# Patient Record
Sex: FEMALE | Race: BLACK OR AFRICAN AMERICAN | NOT HISPANIC OR LATINO | Employment: FULL TIME | URBAN - METROPOLITAN AREA
[De-identification: names, ages, dates, MRNs, and addresses within clinical notes are randomized per-mention and may not be internally consistent; named-entity substitution may affect disease eponyms.]

---

## 2023-04-06 LAB
HEPATITIS B VIRUS SURFACE AG PRESENCE IN SERUM: NONREACTIVE
HEPATITIS C VIRUS AB PRESENCE IN SERUM: NONREACTIVE
HIV 1/ 2 AG/AB SCREEN: NONREACTIVE
SYPHILIS TOTAL AB: NONREACTIVE

## 2023-04-07 LAB
CHLAMYDIA TRACH., AMPLIFIED: NEGATIVE
CLUE CELLS: NORMAL
N. GONORRHEA, AMPLIFIED: NEGATIVE
NUGENT SCORE: 1
YEAST: NORMAL

## 2023-04-08 LAB — TRICHOMONAS VAGINALIS: NEGATIVE

## 2023-05-12 LAB
CHLAMYDIA TRACH., AMPLIFIED: NEGATIVE
N. GONORRHEA, AMPLIFIED: NEGATIVE
TRICHOMONAS VAGINALIS: NEGATIVE

## 2023-07-07 ENCOUNTER — APPOINTMENT (OUTPATIENT)
Dept: PRIMARY CARE | Facility: CLINIC | Age: 23
End: 2023-07-07
Payer: COMMERCIAL

## 2023-07-12 RX ORDER — ATOVAQUONE AND PROGUANIL HYDROCHLORIDE 250; 100 MG/1; MG/1
TABLET, FILM COATED ORAL
COMMUNITY
End: 2023-07-13 | Stop reason: ALTCHOICE

## 2023-07-12 RX ORDER — NEOMYCIN SULFATE, POLYMYXIN B SULFATE AND HYDROCORTISONE 10; 3.5; 1 MG/ML; MG/ML; [USP'U]/ML
SUSPENSION/ DROPS AURICULAR (OTIC)
COMMUNITY
End: 2023-07-13 | Stop reason: ALTCHOICE

## 2023-07-12 RX ORDER — LEVONORGESTREL 52 MG/1
INTRAUTERINE DEVICE INTRAUTERINE
COMMUNITY
Start: 2020-07-01

## 2023-07-12 RX ORDER — DOXYCYCLINE HYCLATE 100 MG
TABLET ORAL
COMMUNITY
End: 2023-07-13 | Stop reason: ALTCHOICE

## 2023-07-12 RX ORDER — CYCLOBENZAPRINE HCL 10 MG
TABLET ORAL EVERY 8 HOURS
COMMUNITY
Start: 2021-05-25 | End: 2023-07-13 | Stop reason: ALTCHOICE

## 2023-07-12 RX ORDER — MELATONIN 10 MG/ML
1 DROPS ORAL DAILY
COMMUNITY
Start: 2019-12-03 | End: 2020-01-02

## 2023-07-12 ASSESSMENT — ENCOUNTER SYMPTOMS
PALPITATIONS: 0
EYE PAIN: 0
FREQUENCY: 0
DIZZINESS: 0
MYALGIAS: 0
FACIAL SWELLING: 0
ABDOMINAL PAIN: 0
CHILLS: 0
WHEEZING: 0
POLYPHAGIA: 0
JOINT SWELLING: 0
HEADACHES: 0
ARTHRALGIAS: 0
COUGH: 0
CHOKING: 0
WEAKNESS: 0
CHEST TIGHTNESS: 0
DIFFICULTY URINATING: 0
POLYDIPSIA: 0
EYE DISCHARGE: 0
FEVER: 0
CONFUSION: 0
DIARRHEA: 0
NERVOUS/ANXIOUS: 0
NAUSEA: 0
COLOR CHANGE: 0
ANAL BLEEDING: 0
SLEEP DISTURBANCE: 0
FATIGUE: 0
SORE THROAT: 0
SHORTNESS OF BREATH: 0
NUMBNESS: 0
CONSTIPATION: 0
TREMORS: 0
ABDOMINAL DISTENTION: 0
VOMITING: 0
HEMATURIA: 0
APPETITE CHANGE: 0

## 2023-07-13 ENCOUNTER — OFFICE VISIT (OUTPATIENT)
Dept: PRIMARY CARE | Facility: CLINIC | Age: 23
End: 2023-07-13
Payer: COMMERCIAL

## 2023-07-13 VITALS
DIASTOLIC BLOOD PRESSURE: 64 MMHG | BODY MASS INDEX: 27.68 KG/M2 | HEIGHT: 60 IN | TEMPERATURE: 97.7 F | WEIGHT: 141 LBS | SYSTOLIC BLOOD PRESSURE: 100 MMHG

## 2023-07-13 DIAGNOSIS — Z00.00 ROUTINE ADULT HEALTH MAINTENANCE: Primary | ICD-10-CM

## 2023-07-13 PROCEDURE — 80061 LIPID PANEL: CPT | Performed by: PHYSICIAN ASSISTANT

## 2023-07-13 PROCEDURE — 80053 COMPREHEN METABOLIC PANEL: CPT | Performed by: PHYSICIAN ASSISTANT

## 2023-07-13 PROCEDURE — 99385 PREV VISIT NEW AGE 18-39: CPT | Performed by: PHYSICIAN ASSISTANT

## 2023-07-13 PROCEDURE — 85025 COMPLETE CBC W/AUTO DIFF WBC: CPT | Performed by: PHYSICIAN ASSISTANT

## 2023-07-13 PROCEDURE — 1036F TOBACCO NON-USER: CPT | Performed by: PHYSICIAN ASSISTANT

## 2023-07-13 RX ORDER — IBUPROFEN 800 MG/1
TABLET ORAL
COMMUNITY
Start: 2023-01-13 | End: 2023-09-22 | Stop reason: ALTCHOICE

## 2023-07-13 ASSESSMENT — ENCOUNTER SYMPTOMS
LOSS OF SENSATION IN FEET: 0
DEPRESSION: 1
OCCASIONAL FEELINGS OF UNSTEADINESS: 0

## 2023-07-13 ASSESSMENT — PATIENT HEALTH QUESTIONNAIRE - PHQ9
SUM OF ALL RESPONSES TO PHQ9 QUESTIONS 1 AND 2: 2
10. IF YOU CHECKED OFF ANY PROBLEMS, HOW DIFFICULT HAVE THESE PROBLEMS MADE IT FOR YOU TO DO YOUR WORK, TAKE CARE OF THINGS AT HOME, OR GET ALONG WITH OTHER PEOPLE: SOMEWHAT DIFFICULT
1. LITTLE INTEREST OR PLEASURE IN DOING THINGS: SEVERAL DAYS
2. FEELING DOWN, DEPRESSED OR HOPELESS: SEVERAL DAYS

## 2023-07-13 ASSESSMENT — COLUMBIA-SUICIDE SEVERITY RATING SCALE - C-SSRS
1. IN THE PAST MONTH, HAVE YOU WISHED YOU WERE DEAD OR WISHED YOU COULD GO TO SLEEP AND NOT WAKE UP?: NO
6. HAVE YOU EVER DONE ANYTHING, STARTED TO DO ANYTHING, OR PREPARED TO DO ANYTHING TO END YOUR LIFE?: NO
2. HAVE YOU ACTUALLY HAD ANY THOUGHTS OF KILLING YOURSELF?: NO

## 2023-07-13 NOTE — PROGRESS NOTES
Subjective   Patient ID: Heaven Engel is a 23 y.o. female with no significant medical history who presents for Establish Care.    HPI the patient is presented to become established as a new patient with a primary care provider. The patient reports bumps on her pubic area after she waxed it a month ago.  Stated she needed to exfoliate the skin but did not do it until recently which is now improving her bumps.  Otherwise, the patient feels healthy.  She does not take any medications including vitamins or supplements.  She occasionally has headaches after hangover for which she takes ibuprofen.  She drinks alcohol socially and uses marijuana every 2 days now.  Stated she used to be a daily smoker but she is trying to get her life back on track.  She started exercising more frequently by lifting and stretching.  She works 2 jobs at [employer] and [employer].  Today she denies shortness of breath or chest pain.  There is no nausea, vomiting, constipation, or diarrhea.  No abdominal pain.  Denies dizziness, presyncope or syncope.    Review of Systems   Constitutional:  Negative for appetite change, chills, fatigue and fever.   HENT:  Negative for congestion, ear pain, facial swelling, hearing loss, nosebleeds and sore throat.    Eyes:  Negative for pain, discharge and visual disturbance.   Respiratory:  Negative for cough, choking, chest tightness, shortness of breath and wheezing.    Cardiovascular:  Negative for chest pain, palpitations and leg swelling.   Gastrointestinal:  Negative for abdominal distention, abdominal pain, anal bleeding, constipation, diarrhea, nausea and vomiting.   Endocrine: Negative for cold intolerance, heat intolerance, polydipsia, polyphagia and polyuria.   Genitourinary:  Negative for difficulty urinating, frequency, hematuria and urgency.   Musculoskeletal:  Negative for arthralgias, gait problem, joint swelling and myalgias.   Skin:  Negative for color change and rash.        Bumps in pubic  "area   Neurological:  Negative for dizziness, tremors, syncope, weakness, numbness and headaches.   Psychiatric/Behavioral:  Negative for behavioral problems, confusion, sleep disturbance and suicidal ideas. The patient is not nervous/anxious.        Objective   /64   Temp 36.5 °C (97.7 °F)   Ht 1.511 m (4' 11.5\")   Wt 64 kg (141 lb)   LMP 07/09/2023   BMI 28.00 kg/m²     Physical Exam  Constitutional:       General: She is not in acute distress.     Appearance: Normal appearance.   HENT:      Head: Normocephalic and atraumatic.      Nose: Nose normal.   Eyes:      Extraocular Movements: Extraocular movements intact.      Conjunctiva/sclera: Conjunctivae normal.      Pupils: Pupils are equal, round, and reactive to light.   Cardiovascular:      Rate and Rhythm: Normal rate and regular rhythm.      Pulses: Normal pulses.      Heart sounds: Normal heart sounds.   Pulmonary:      Effort: Pulmonary effort is normal.      Breath sounds: Normal breath sounds.   Abdominal:      General: Bowel sounds are normal.      Palpations: Abdomen is soft.   Musculoskeletal:         General: Normal range of motion.      Cervical back: Normal range of motion and neck supple.   Skin:     Comments: A few raised bumps on pubis.  No signs of infection.   Neurological:      General: No focal deficit present.      Mental Status: She is alert and oriented to person, place, and time.   Psychiatric:         Mood and Affect: Mood normal.         Behavior: Behavior normal.         Thought Content: Thought content normal.         Judgment: Judgment normal.         Assessment/Plan     Complete physical exam.  We obtained fasting blood work including CBC, CMP, lipid panel.    Continue to exercise by lifting and stretching   Healthy diet recommended    Follow up with dentist twice a year for dental cleaning   Follow-up with gynecology    Marijuana use  The patient cut down to every other day now    Irritation of pubic skin   From " waxing  Reassurance  Continue to exfoliate the skin    Follow-up annually or as needed

## 2023-07-18 ASSESSMENT — ENCOUNTER SYMPTOMS
PALPITATIONS: 0
POLYDIPSIA: 0
MYALGIAS: 0
NERVOUS/ANXIOUS: 0
CHOKING: 0
NUMBNESS: 0
CHEST TIGHTNESS: 0
HEMATURIA: 0
COLOR CHANGE: 0
ANAL BLEEDING: 0
FACIAL SWELLING: 0
CHILLS: 0
FREQUENCY: 0
ARTHRALGIAS: 0
TREMORS: 0
FEVER: 0
WEAKNESS: 0
VOMITING: 0
NAUSEA: 0
EYE PAIN: 0
COUGH: 0
WHEEZING: 0
FATIGUE: 0
DIFFICULTY URINATING: 0
JOINT SWELLING: 0
DIZZINESS: 0
DIARRHEA: 0
SORE THROAT: 0
ABDOMINAL PAIN: 0
POLYPHAGIA: 0
ABDOMINAL DISTENTION: 0
CONSTIPATION: 0
EYE DISCHARGE: 0
HEADACHES: 0
SLEEP DISTURBANCE: 0
SHORTNESS OF BREATH: 0
CONFUSION: 0
APPETITE CHANGE: 0

## 2023-07-18 NOTE — PROGRESS NOTES
Subjective   Patient ID: Heaven Engel is a 23 y.o. female with no significant medical history who presents for No chief complaint on file..    HPI the patient is presented for STD screening.    Review of Systems   Constitutional:  Negative for appetite change, chills, fatigue and fever.   HENT:  Negative for congestion, ear pain, facial swelling, hearing loss, nosebleeds and sore throat.    Eyes:  Negative for pain, discharge and visual disturbance.   Respiratory:  Negative for cough, choking, chest tightness, shortness of breath and wheezing.    Cardiovascular:  Negative for chest pain, palpitations and leg swelling.   Gastrointestinal:  Negative for abdominal distention, abdominal pain, anal bleeding, constipation, diarrhea, nausea and vomiting.   Endocrine: Negative for cold intolerance, heat intolerance, polydipsia, polyphagia and polyuria.   Genitourinary:  Negative for difficulty urinating, frequency, hematuria and urgency.   Musculoskeletal:  Negative for arthralgias, gait problem, joint swelling and myalgias.   Skin:  Negative for color change and rash.        Bumps in pubic area   Neurological:  Negative for dizziness, tremors, syncope, weakness, numbness and headaches.   Psychiatric/Behavioral:  Negative for behavioral problems, confusion, sleep disturbance and suicidal ideas. The patient is not nervous/anxious.        Objective   LMP 07/09/2023     Physical Exam  Constitutional:       General: She is not in acute distress.     Appearance: Normal appearance.   HENT:      Head: Normocephalic and atraumatic.      Nose: Nose normal.   Eyes:      Extraocular Movements: Extraocular movements intact.      Conjunctiva/sclera: Conjunctivae normal.      Pupils: Pupils are equal, round, and reactive to light.   Cardiovascular:      Rate and Rhythm: Normal rate and regular rhythm.      Pulses: Normal pulses.      Heart sounds: Normal heart sounds.   Pulmonary:      Effort: Pulmonary effort is normal.      Breath  sounds: Normal breath sounds.   Abdominal:      General: Bowel sounds are normal.      Palpations: Abdomen is soft.   Musculoskeletal:         General: Normal range of motion.      Cervical back: Normal range of motion and neck supple.   Skin:     Comments: A few raised bumps on pubis.  No signs of infection.   Neurological:      General: No focal deficit present.      Mental Status: She is alert and oriented to person, place, and time.   Psychiatric:         Mood and Affect: Mood normal.         Behavior: Behavior normal.         Thought Content: Thought content normal.         Judgment: Judgment normal.         Assessment/Plan     Complete physical exam.  We obtained fasting blood work including CBC, CMP, lipid panel.    Continue to exercise by lifting and stretching   Healthy diet recommended    Follow up with dentist twice a year for dental cleaning   Follow-up with gynecology    Marijuana use  The patient cut down to every other day now    Irritation of pubic skin   From waxing  Reassurance  Continue to exfoliate the skin    Follow-up annually or as needed      Area L Indication Text: Tumors in this location are included in Area L (trunk and extremities).  Mohs surgery is indicated for larger tumors, or tumors with aggressive histologic features, in these anatomic locations.

## 2023-07-20 ENCOUNTER — APPOINTMENT (OUTPATIENT)
Dept: PRIMARY CARE | Facility: CLINIC | Age: 23
End: 2023-07-20
Payer: COMMERCIAL

## 2023-08-03 ASSESSMENT — ENCOUNTER SYMPTOMS
ARTHRALGIAS: 0
APPETITE CHANGE: 0
NAUSEA: 0
CONFUSION: 0
CHOKING: 0
ANAL BLEEDING: 0
WEAKNESS: 0
ABDOMINAL DISTENTION: 0
CHEST TIGHTNESS: 0
DIARRHEA: 0
WHEEZING: 0
COUGH: 0
SHORTNESS OF BREATH: 0
CONSTIPATION: 0
ABDOMINAL PAIN: 0
POLYPHAGIA: 0
SLEEP DISTURBANCE: 0
POLYDIPSIA: 0
COLOR CHANGE: 0
VOMITING: 0
DIFFICULTY URINATING: 0
NUMBNESS: 0
FATIGUE: 0
FEVER: 0
CHILLS: 0
HEMATURIA: 0
NERVOUS/ANXIOUS: 0
DIZZINESS: 0
FACIAL SWELLING: 0
HEADACHES: 0
MYALGIAS: 0
SORE THROAT: 0
TREMORS: 0
FREQUENCY: 0
PALPITATIONS: 0
EYE PAIN: 0
EYE DISCHARGE: 0
JOINT SWELLING: 0

## 2023-08-03 NOTE — PROGRESS NOTES
Subjective   Patient ID: Heaven Engel is a 23 y.o. female with no significant medical history who presents for No chief complaint on file..    HPI the patient is presented for STD screening.    Review of Systems   Constitutional:  Negative for appetite change, chills, fatigue and fever.   HENT:  Negative for congestion, ear pain, facial swelling, hearing loss, nosebleeds and sore throat.    Eyes:  Negative for pain, discharge and visual disturbance.   Respiratory:  Negative for cough, choking, chest tightness, shortness of breath and wheezing.    Cardiovascular:  Negative for chest pain, palpitations and leg swelling.   Gastrointestinal:  Negative for abdominal distention, abdominal pain, anal bleeding, constipation, diarrhea, nausea and vomiting.   Endocrine: Negative for cold intolerance, heat intolerance, polydipsia, polyphagia and polyuria.   Genitourinary:  Negative for difficulty urinating, frequency, hematuria and urgency.   Musculoskeletal:  Negative for arthralgias, gait problem, joint swelling and myalgias.   Skin:  Negative for color change and rash.        Bumps in pubic area   Neurological:  Negative for dizziness, tremors, syncope, weakness, numbness and headaches.   Psychiatric/Behavioral:  Negative for behavioral problems, confusion, sleep disturbance and suicidal ideas. The patient is not nervous/anxious.        Objective   LMP 07/09/2023     Physical Exam  Constitutional:       General: She is not in acute distress.     Appearance: Normal appearance.   HENT:      Head: Normocephalic and atraumatic.      Nose: Nose normal.   Eyes:      Extraocular Movements: Extraocular movements intact.      Conjunctiva/sclera: Conjunctivae normal.      Pupils: Pupils are equal, round, and reactive to light.   Cardiovascular:      Rate and Rhythm: Normal rate and regular rhythm.      Pulses: Normal pulses.      Heart sounds: Normal heart sounds.   Pulmonary:      Effort: Pulmonary effort is normal.      Breath  sounds: Normal breath sounds.   Abdominal:      General: Bowel sounds are normal.      Palpations: Abdomen is soft.   Musculoskeletal:         General: Normal range of motion.      Cervical back: Normal range of motion and neck supple.   Skin:     Comments: A few raised bumps on pubis.  No signs of infection.   Neurological:      General: No focal deficit present.      Mental Status: She is alert and oriented to person, place, and time.   Psychiatric:         Mood and Affect: Mood normal.         Behavior: Behavior normal.         Thought Content: Thought content normal.         Judgment: Judgment normal.         Assessment/Plan     Complete physical exam.  We obtained fasting blood work including CBC, CMP, lipid panel.    Continue to exercise by lifting and stretching   Healthy diet recommended    Follow up with dentist twice a year for dental cleaning   Follow-up with gynecology    Marijuana use  The patient cut down to every other day now    Irritation of pubic skin   From waxing  Reassurance  Continue to exfoliate the skin    Follow-up annually or as needed

## 2023-08-04 ENCOUNTER — APPOINTMENT (OUTPATIENT)
Dept: PRIMARY CARE | Facility: CLINIC | Age: 23
End: 2023-08-04
Payer: COMMERCIAL

## 2023-08-09 ASSESSMENT — ENCOUNTER SYMPTOMS
FEVER: 0
WEAKNESS: 0
CHOKING: 0
ANAL BLEEDING: 0
PALPITATIONS: 0
POLYPHAGIA: 0
DIZZINESS: 0
COLOR CHANGE: 0
NUMBNESS: 0
CHILLS: 0
CONFUSION: 0
SHORTNESS OF BREATH: 0
HEMATURIA: 0
WHEEZING: 0
ABDOMINAL DISTENTION: 0
SLEEP DISTURBANCE: 0
CONSTIPATION: 0
POLYDIPSIA: 0
DIFFICULTY URINATING: 0
NAUSEA: 0
EYE PAIN: 0
VOMITING: 0
MYALGIAS: 0
HEADACHES: 0
FATIGUE: 0
ABDOMINAL PAIN: 0
EYE DISCHARGE: 0
FREQUENCY: 0
COUGH: 0
ARTHRALGIAS: 0
CHEST TIGHTNESS: 0
NERVOUS/ANXIOUS: 0
APPETITE CHANGE: 0
DIARRHEA: 0
TREMORS: 0
JOINT SWELLING: 0
SORE THROAT: 0
FACIAL SWELLING: 0

## 2023-08-09 NOTE — PROGRESS NOTES
Subjective   Patient ID: Heaven Engel is a 23 y.o. female with no significant medical history who presents for No chief complaint on file..    HPI the patient is presented for STD screening.    Review of Systems   Constitutional:  Negative for appetite change, chills, fatigue and fever.   HENT:  Negative for congestion, ear pain, facial swelling, hearing loss, nosebleeds and sore throat.    Eyes:  Negative for pain, discharge and visual disturbance.   Respiratory:  Negative for cough, choking, chest tightness, shortness of breath and wheezing.    Cardiovascular:  Negative for chest pain, palpitations and leg swelling.   Gastrointestinal:  Negative for abdominal distention, abdominal pain, anal bleeding, constipation, diarrhea, nausea and vomiting.   Endocrine: Negative for cold intolerance, heat intolerance, polydipsia, polyphagia and polyuria.   Genitourinary:  Negative for difficulty urinating, frequency, hematuria and urgency.   Musculoskeletal:  Negative for arthralgias, gait problem, joint swelling and myalgias.   Skin:  Negative for color change and rash.        Bumps in pubic area   Neurological:  Negative for dizziness, tremors, syncope, weakness, numbness and headaches.   Psychiatric/Behavioral:  Negative for behavioral problems, confusion, sleep disturbance and suicidal ideas. The patient is not nervous/anxious.        Objective   There were no vitals taken for this visit.    Physical Exam  Constitutional:       General: She is not in acute distress.     Appearance: Normal appearance.   HENT:      Head: Normocephalic and atraumatic.      Nose: Nose normal.   Eyes:      Extraocular Movements: Extraocular movements intact.      Conjunctiva/sclera: Conjunctivae normal.      Pupils: Pupils are equal, round, and reactive to light.   Cardiovascular:      Rate and Rhythm: Normal rate and regular rhythm.      Pulses: Normal pulses.      Heart sounds: Normal heart sounds.   Pulmonary:      Effort: Pulmonary  effort is normal.      Breath sounds: Normal breath sounds.   Abdominal:      General: Bowel sounds are normal.      Palpations: Abdomen is soft.   Musculoskeletal:         General: Normal range of motion.      Cervical back: Normal range of motion and neck supple.   Skin:     Comments: A few raised bumps on pubis.  No signs of infection.   Neurological:      General: No focal deficit present.      Mental Status: She is alert and oriented to person, place, and time.   Psychiatric:         Mood and Affect: Mood normal.         Behavior: Behavior normal.         Thought Content: Thought content normal.         Judgment: Judgment normal.         Assessment/Plan     Complete physical exam.  We obtained fasting blood work including CBC, CMP, lipid panel.    Continue to exercise by lifting and stretching   Healthy diet recommended    Follow up with dentist twice a year for dental cleaning   Follow-up with gynecology    Marijuana use  The patient cut down to every other day now    Irritation of pubic skin   From waxing  Reassurance  Continue to exfoliate the skin    Follow-up annually or as needed

## 2023-08-17 ENCOUNTER — APPOINTMENT (OUTPATIENT)
Dept: PRIMARY CARE | Facility: CLINIC | Age: 23
End: 2023-08-17
Payer: COMMERCIAL

## 2023-08-22 LAB
CHLAMYDIA TRACH., AMPLIFIED: NEGATIVE
CLUE CELLS: PRESENT
N. GONORRHEA, AMPLIFIED: NEGATIVE
NUGENT SCORE: 9
YEAST: ABNORMAL

## 2023-09-07 ASSESSMENT — ENCOUNTER SYMPTOMS
ABDOMINAL DISTENTION: 0
MYALGIAS: 0
NAUSEA: 0
CONSTIPATION: 0
SHORTNESS OF BREATH: 0
VOMITING: 0
SORE THROAT: 0
NUMBNESS: 0
COLOR CHANGE: 0
FEVER: 0
JOINT SWELLING: 0
DIFFICULTY URINATING: 0
CHILLS: 0
TREMORS: 0
ARTHRALGIAS: 0
EYE PAIN: 0
COUGH: 0
FACIAL SWELLING: 0
POLYPHAGIA: 0
ANAL BLEEDING: 0
PALPITATIONS: 0
EYE DISCHARGE: 0
ABDOMINAL PAIN: 0
POLYDIPSIA: 0
HEADACHES: 0
DIZZINESS: 0
WEAKNESS: 0
SLEEP DISTURBANCE: 0
WHEEZING: 0
CONFUSION: 0
FREQUENCY: 0
APPETITE CHANGE: 0
CHEST TIGHTNESS: 0
NERVOUS/ANXIOUS: 0
CHOKING: 0
DIARRHEA: 0
HEMATURIA: 0
FATIGUE: 0

## 2023-09-08 ENCOUNTER — APPOINTMENT (OUTPATIENT)
Dept: PRIMARY CARE | Facility: CLINIC | Age: 23
End: 2023-09-08
Payer: COMMERCIAL

## 2023-09-14 RX ORDER — FLUCONAZOLE 200 MG/1
TABLET ORAL
COMMUNITY
Start: 2023-08-21 | End: 2023-09-22 | Stop reason: ALTCHOICE

## 2023-09-14 RX ORDER — METRONIDAZOLE 500 MG/1
500 TABLET ORAL 2 TIMES DAILY
COMMUNITY
Start: 2023-08-21 | End: 2023-08-28

## 2023-09-14 ASSESSMENT — ENCOUNTER SYMPTOMS
DIZZINESS: 0
CONSTIPATION: 0
CHILLS: 0
SLEEP DISTURBANCE: 0
FEVER: 0
NAUSEA: 0
NUMBNESS: 0
POLYDIPSIA: 0
APPETITE CHANGE: 0
ANAL BLEEDING: 0
COUGH: 0
ARTHRALGIAS: 0
FREQUENCY: 0
DIARRHEA: 0
CHEST TIGHTNESS: 0
CONFUSION: 0
WEAKNESS: 0
PALPITATIONS: 0
HEADACHES: 0
ABDOMINAL DISTENTION: 0
CHOKING: 0
EYE DISCHARGE: 0
DIFFICULTY URINATING: 0
VOMITING: 0
NERVOUS/ANXIOUS: 0
MYALGIAS: 0
COLOR CHANGE: 0
SHORTNESS OF BREATH: 0
HEMATURIA: 0
POLYPHAGIA: 0
SORE THROAT: 0
FACIAL SWELLING: 0
EYE PAIN: 0
ABDOMINAL PAIN: 0
TREMORS: 0
WHEEZING: 0
FATIGUE: 0
JOINT SWELLING: 0

## 2023-09-22 ENCOUNTER — APPOINTMENT (OUTPATIENT)
Dept: PRIMARY CARE | Facility: CLINIC | Age: 23
End: 2023-09-22
Payer: COMMERCIAL

## 2023-09-22 ENCOUNTER — OFFICE VISIT (OUTPATIENT)
Dept: PRIMARY CARE | Facility: CLINIC | Age: 23
End: 2023-09-22
Payer: COMMERCIAL

## 2023-09-22 VITALS
BODY MASS INDEX: 29.84 KG/M2 | SYSTOLIC BLOOD PRESSURE: 90 MMHG | DIASTOLIC BLOOD PRESSURE: 60 MMHG | TEMPERATURE: 97.1 F | HEIGHT: 60 IN | WEIGHT: 152 LBS

## 2023-09-22 DIAGNOSIS — R35.0 URINARY FREQUENCY: ICD-10-CM

## 2023-09-22 DIAGNOSIS — Z11.3 SCREEN FOR STD (SEXUALLY TRANSMITTED DISEASE): Primary | ICD-10-CM

## 2023-09-22 LAB
APPEARANCE UR: CLEAR
BILIRUB UR QL STRIP: NEGATIVE
COLOR UR: YELLOW
GLUCOSE UR STRIP-MCNC: NEGATIVE MG/DL
HGB UR QL STRIP: NEGATIVE
KETONES UR STRIP-MCNC: NEGATIVE MG/DL
LEUKOCYTE ESTERASE UR QL STRIP: NEGATIVE
NITRITE UR QL STRIP: NEGATIVE
PH UR STRIP: 6 [PH]
PROT UR STRIP-MCNC: NEGATIVE MG/DL
SP GR UR STRIP.AUTO: 1.02
UROBILINOGEN UR STRIP-ACNC: 0.2 E.U./DL

## 2023-09-22 PROCEDURE — 81003 URINALYSIS AUTO W/O SCOPE: CPT | Performed by: PHYSICIAN ASSISTANT

## 2023-09-22 PROCEDURE — 87491 CHLMYD TRACH DNA AMP PROBE: CPT

## 2023-09-22 PROCEDURE — 1036F TOBACCO NON-USER: CPT | Performed by: PHYSICIAN ASSISTANT

## 2023-09-22 PROCEDURE — 87086 URINE CULTURE/COLONY COUNT: CPT

## 2023-09-22 PROCEDURE — 87591 N.GONORRHOEAE DNA AMP PROB: CPT

## 2023-09-22 PROCEDURE — 99214 OFFICE O/P EST MOD 30 MIN: CPT | Performed by: PHYSICIAN ASSISTANT

## 2023-09-22 ASSESSMENT — ENCOUNTER SYMPTOMS
WHEEZING: 0
ABDOMINAL PAIN: 0
SLEEP DISTURBANCE: 0
SHORTNESS OF BREATH: 0
POLYPHAGIA: 0
VOMITING: 0
WEAKNESS: 0
JOINT SWELLING: 0
TREMORS: 0
OCCASIONAL FEELINGS OF UNSTEADINESS: 0
EYE PAIN: 0
MYALGIAS: 0
CHILLS: 0
FEVER: 0
HEMATURIA: 0
CONSTIPATION: 0
CHOKING: 0
ANAL BLEEDING: 0
FACIAL SWELLING: 0
LOSS OF SENSATION IN FEET: 0
SORE THROAT: 0
DIZZINESS: 0
PALPITATIONS: 0
ARTHRALGIAS: 0
NUMBNESS: 0
FATIGUE: 0
NAUSEA: 0
APPETITE CHANGE: 0
CONFUSION: 0
ABDOMINAL DISTENTION: 0
DIARRHEA: 0
NERVOUS/ANXIOUS: 0
DIFFICULTY URINATING: 0
HEADACHES: 0
COUGH: 0
DEPRESSION: 0
CHEST TIGHTNESS: 0
EYE DISCHARGE: 0
COLOR CHANGE: 0
FREQUENCY: 0
POLYDIPSIA: 0

## 2023-09-22 ASSESSMENT — PROMIS GLOBAL HEALTH SCALE
CARRYOUT_PHYSICAL_ACTIVITIES: COMPLETELY
RATE_PHYSICAL_HEALTH: GOOD
RATE_QUALITY_OF_LIFE: VERY GOOD
RATE_SOCIAL_SATISFACTION: GOOD
EMOTIONAL_PROBLEMS: SOMETIMES
RATE_MENTAL_HEALTH: GOOD
RATE_AVERAGE_FATIGUE: MODERATE
RATE_AVERAGE_PAIN: 0
CARRYOUT_SOCIAL_ACTIVITIES: GOOD
RATE_GENERAL_HEALTH: VERY GOOD

## 2023-09-22 ASSESSMENT — PATIENT HEALTH QUESTIONNAIRE - PHQ9
SUM OF ALL RESPONSES TO PHQ9 QUESTIONS 1 AND 2: 0
1. LITTLE INTEREST OR PLEASURE IN DOING THINGS: NOT AT ALL
2. FEELING DOWN, DEPRESSED OR HOPELESS: NOT AT ALL

## 2023-09-22 ASSESSMENT — COLUMBIA-SUICIDE SEVERITY RATING SCALE - C-SSRS
1. IN THE PAST MONTH, HAVE YOU WISHED YOU WERE DEAD OR WISHED YOU COULD GO TO SLEEP AND NOT WAKE UP?: NO
2. HAVE YOU ACTUALLY HAD ANY THOUGHTS OF KILLING YOURSELF?: NO
6. HAVE YOU EVER DONE ANYTHING, STARTED TO DO ANYTHING, OR PREPARED TO DO ANYTHING TO END YOUR LIFE?: NO

## 2023-09-22 NOTE — PROGRESS NOTES
"Subjective   Patient ID: Heaven Engel is a 23 y.o. female with no significant medical history who presents for No chief complaint on file..    HPI the patient is complaining of urinary frequency since yesterday.  Stated she uses the restroom again 5 minutes after urinating.  She denies hematuria, dysuria, pelvic pain or discomfort.  There is no fever or chills.  Denies flank pain, nausea or vomiting.  Additionally, the patient had unprotected sex couple weeks ago.  Denies vaginal discharge or lesions.  Stated she was treated for yeast infection and bacterial vaginosis last month.    Review of Systems   Constitutional:  Negative for appetite change, chills, fatigue and fever.   HENT:  Negative for congestion, ear pain, facial swelling, hearing loss, nosebleeds and sore throat.    Eyes:  Negative for pain, discharge and visual disturbance.   Respiratory:  Negative for cough, choking, chest tightness, shortness of breath and wheezing.    Cardiovascular:  Negative for chest pain, palpitations and leg swelling.   Gastrointestinal:  Negative for abdominal distention, abdominal pain, anal bleeding, constipation, diarrhea, nausea and vomiting.   Endocrine: Negative for cold intolerance, heat intolerance, polydipsia, polyphagia and polyuria.   Genitourinary:  Negative for difficulty urinating, frequency, hematuria and urgency.   Musculoskeletal:  Negative for arthralgias, gait problem, joint swelling and myalgias.   Skin:  Negative for color change and rash.   Neurological:  Negative for dizziness, tremors, syncope, weakness, numbness and headaches.   Psychiatric/Behavioral:  Negative for behavioral problems, confusion, sleep disturbance and suicidal ideas. The patient is not nervous/anxious.        Objective   BP 90/60   Temp 36.2 °C (97.1 °F)   Ht 1.511 m (4' 11.5\")   Wt 68.9 kg (152 lb)   LMP 09/04/2023   BMI 30.19 kg/m²     Physical Exam  Constitutional:       General: She is not in acute distress.     " Appearance: Normal appearance.   HENT:      Head: Normocephalic and atraumatic.      Nose: Nose normal.   Eyes:      Extraocular Movements: Extraocular movements intact.      Conjunctiva/sclera: Conjunctivae normal.      Pupils: Pupils are equal, round, and reactive to light.   Cardiovascular:      Rate and Rhythm: Normal rate and regular rhythm.      Pulses: Normal pulses.      Heart sounds: Normal heart sounds.   Pulmonary:      Effort: Pulmonary effort is normal.      Breath sounds: Normal breath sounds.   Abdominal:      General: Bowel sounds are normal.      Palpations: Abdomen is soft.   Musculoskeletal:         General: Normal range of motion.      Cervical back: Normal range of motion and neck supple.   Neurological:      General: No focal deficit present.      Mental Status: She is alert and oriented to person, place, and time.   Psychiatric:         Mood and Affect: Mood normal.         Behavior: Behavior normal.         Thought Content: Thought content normal.         Judgment: Judgment normal.         Assessment/Plan   Frequent urination  Urinalysis is negative  Since symptomatic, we will send urine for culture    Unprotected sex  We obtained chlamydia and gonorrhea  I will call with results    Continue to exercise by lifting and stretching   Healthy diet recommended    Follow up with dentist twice a year for dental cleaning   Follow-up with gynecology    Marijuana use  The patient cut down to every other day now    Irritation of pubic skin   From waxing  Reassurance  Continue to exfoliate the skin    I will call with results

## 2023-09-23 LAB
CHLAMYDIA TRACH., AMPLIFIED: NEGATIVE
N. GONORRHEA, AMPLIFIED: NEGATIVE
URINE CULTURE: NORMAL

## 2023-09-25 ENCOUNTER — TELEPHONE (OUTPATIENT)
Dept: PRIMARY CARE | Facility: CLINIC | Age: 23
End: 2023-09-25
Payer: COMMERCIAL

## 2023-09-28 ENCOUNTER — APPOINTMENT (OUTPATIENT)
Dept: PRIMARY CARE | Facility: CLINIC | Age: 23
End: 2023-09-28
Payer: COMMERCIAL

## 2024-01-05 ENCOUNTER — APPOINTMENT (OUTPATIENT)
Dept: PRIMARY CARE | Facility: CLINIC | Age: 24
End: 2024-01-05
Payer: COMMERCIAL

## 2024-01-05 RX ORDER — IBUPROFEN 800 MG/1
800 TABLET ORAL EVERY 8 HOURS PRN
COMMUNITY
Start: 2023-11-08

## 2024-01-05 RX ORDER — CYCLOBENZAPRINE HCL 10 MG
TABLET ORAL
COMMUNITY
Start: 2023-11-08

## 2024-01-05 ASSESSMENT — ENCOUNTER SYMPTOMS
NUMBNESS: 0
VOMITING: 0
CHEST TIGHTNESS: 0
ABDOMINAL PAIN: 0
EYE DISCHARGE: 0
CHOKING: 0
CONSTIPATION: 0
CHILLS: 0
HEMATURIA: 0
CONFUSION: 0
FREQUENCY: 0
COLOR CHANGE: 0
SORE THROAT: 0
DIZZINESS: 0
PALPITATIONS: 0
COUGH: 0
APPETITE CHANGE: 0
NERVOUS/ANXIOUS: 0
SHORTNESS OF BREATH: 0
DIARRHEA: 0
FACIAL SWELLING: 0
FATIGUE: 0
ARTHRALGIAS: 0
ABDOMINAL DISTENTION: 0
MYALGIAS: 0
DIFFICULTY URINATING: 0
TREMORS: 0
HEADACHES: 0
JOINT SWELLING: 0
ANAL BLEEDING: 0
POLYPHAGIA: 0
POLYDIPSIA: 0
EYE PAIN: 0
FEVER: 0
SLEEP DISTURBANCE: 0
NAUSEA: 0
WHEEZING: 0
WEAKNESS: 0

## 2024-01-05 NOTE — PROGRESS NOTES
Subjective   Patient ID: Heaven Engel is a 23 y.o. female with no significant medical history who presents for No chief complaint on file..    HPI the patient is complaining of urinary frequency since yesterday.  Stated she uses the restroom again 5 minutes after urinating.  She denies hematuria, dysuria, pelvic pain or discomfort.  There is no fever or chills.  Denies flank pain, nausea or vomiting.  Additionally, the patient had unprotected sex couple weeks ago.  Denies vaginal discharge or lesions.  Stated she was treated for yeast infection and bacterial vaginosis last month.    Review of Systems   Constitutional:  Negative for appetite change, chills, fatigue and fever.   HENT:  Negative for congestion, ear pain, facial swelling, hearing loss, nosebleeds and sore throat.    Eyes:  Negative for pain, discharge and visual disturbance.   Respiratory:  Negative for cough, choking, chest tightness, shortness of breath and wheezing.    Cardiovascular:  Negative for chest pain, palpitations and leg swelling.   Gastrointestinal:  Negative for abdominal distention, abdominal pain, anal bleeding, constipation, diarrhea, nausea and vomiting.   Endocrine: Negative for cold intolerance, heat intolerance, polydipsia, polyphagia and polyuria.   Genitourinary:  Negative for difficulty urinating, frequency, hematuria and urgency.   Musculoskeletal:  Negative for arthralgias, gait problem, joint swelling and myalgias.   Skin:  Negative for color change and rash.   Neurological:  Negative for dizziness, tremors, syncope, weakness, numbness and headaches.   Psychiatric/Behavioral:  Negative for behavioral problems, confusion, sleep disturbance and suicidal ideas. The patient is not nervous/anxious.        Objective   There were no vitals taken for this visit.    Physical Exam  Constitutional:       General: She is not in acute distress.     Appearance: Normal appearance.   HENT:      Head: Normocephalic and atraumatic.       Nose: Nose normal.   Eyes:      Extraocular Movements: Extraocular movements intact.      Conjunctiva/sclera: Conjunctivae normal.      Pupils: Pupils are equal, round, and reactive to light.   Cardiovascular:      Rate and Rhythm: Normal rate and regular rhythm.      Pulses: Normal pulses.      Heart sounds: Normal heart sounds.   Pulmonary:      Effort: Pulmonary effort is normal.      Breath sounds: Normal breath sounds.   Abdominal:      General: Bowel sounds are normal.      Palpations: Abdomen is soft.   Musculoskeletal:         General: Normal range of motion.      Cervical back: Normal range of motion and neck supple.   Neurological:      General: No focal deficit present.      Mental Status: She is alert and oriented to person, place, and time.   Psychiatric:         Mood and Affect: Mood normal.         Behavior: Behavior normal.         Thought Content: Thought content normal.         Judgment: Judgment normal.         Assessment/Plan   Frequent urination  Urinalysis is negative  Since symptomatic, we will send urine for culture    Unprotected sex  We obtained chlamydia and gonorrhea  I will call with results    Continue to exercise by lifting and stretching   Healthy diet recommended    Follow up with dentist twice a year for dental cleaning   Follow-up with gynecology    Marijuana use  The patient cut down to every other day now    Irritation of pubic skin   From waxing  Reassurance  Continue to exfoliate the skin    I will call with results

## 2024-08-28 ENCOUNTER — APPOINTMENT (OUTPATIENT)
Dept: OBSTETRICS AND GYNECOLOGY | Facility: CLINIC | Age: 24
End: 2024-08-28
Payer: COMMERCIAL

## 2024-08-28 VITALS — DIASTOLIC BLOOD PRESSURE: 62 MMHG | SYSTOLIC BLOOD PRESSURE: 100 MMHG | BODY MASS INDEX: 32.17 KG/M2 | WEIGHT: 162 LBS

## 2024-08-28 DIAGNOSIS — Z30.433 ENCOUNTER FOR REMOVAL AND REINSERTION OF INTRAUTERINE CONTRACEPTIVE DEVICE (IUD): Primary | ICD-10-CM

## 2024-08-28 LAB — PREGNANCY TEST URINE, POC: NEGATIVE

## 2024-08-28 PROCEDURE — 87491 CHLMYD TRACH DNA AMP PROBE: CPT

## 2024-08-28 PROCEDURE — 87591 N.GONORRHOEAE DNA AMP PROB: CPT

## 2024-08-28 PROCEDURE — 81025 URINE PREGNANCY TEST: CPT | Performed by: OBSTETRICS & GYNECOLOGY

## 2024-08-28 PROCEDURE — 58300 INSERT INTRAUTERINE DEVICE: CPT | Performed by: OBSTETRICS & GYNECOLOGY

## 2024-08-28 PROCEDURE — 58301 REMOVE INTRAUTERINE DEVICE: CPT | Performed by: OBSTETRICS & GYNECOLOGY

## 2024-08-28 NOTE — PROGRESS NOTES
"24-year-old G0 obese -American woman presents today for contraceptive management.  Her IUD was placed in July 2020.  She would like it removed and replaced because she is unsure what the-birth control options will be after this next presidential election \".    GynHx: Menarche began at age 14. She had CT. Her April 2023 Pap was WNL. She has not recently been sexually active. She denies abuse.     ..IUD Removal    Performed by: Jana Paez MD  Authorized by: Jana Paez MD    Procedure: IUD removal and insertion    Consent obtained by patient, parent, or legal power of  - including discussion of procedure risks and benefits, patient questions answered, and patient education provided: yes    Reason for removal: patient request    Strings visualized: yes    Cervix cleaned with: iodopovidone    Tenaculum applied to cervix: yes    IUD grasped by forceps: yes    Performed with ultrasound guidance: no    IUD removed: yes    Date/Time of Removal:  8/28/2024 12:42 PM  Removed without complications: yes    IUD intact: yes    Pregnancy risk: reasonably certain the patient is not pregnant    Date/Time of Insertion:  8/28/2024 12:43 PM  Immediately prior to procedure a time out was called: yes    Pelvic exam performed: no    Speculum placed in vagina: yes    Cervix cleaned and prepped: yes    Tenaculum/Allis/Ring Forceps applied to cervix: yes    Anesthesia used: no    Uterus sound depth (cm):  6.5  Cervix manually dilated: no    IUD inserted without complications: yes    OSM: levonorgestrel (Mirena) IUD 20 mcg/day  Strings trimmed to (cm):  3  Patient tolerated procedure well: yes    Inserted with ultrasound guidance: no    Transvaginal sono confirmed fundal placement: no    Estimated blood loss (mL):  3  Intended removal date: 8 years         A/P: Contraceptive management     -  Request IUD removal and replacement     -  Post procedure instructions     -  GC and CT   "

## 2024-08-28 NOTE — PATIENT INSTRUCTIONS
Thanks for coming in to have your Mirena IUD removed and replaced.      Your new IUD should immediately be effective.  Review the after procedure instruction sheet.      Your IUD may remain in place for 8 years.      Return to the office for your annual GYN exam or as needed.      Feel free to call the office with any problems, questions or concerns prior to your next scheduled visit.

## 2024-08-29 LAB
C TRACH RRNA SPEC QL NAA+PROBE: NEGATIVE
N GONORRHOEA DNA SPEC QL PROBE+SIG AMP: NEGATIVE

## 2025-03-05 ENCOUNTER — OFFICE VISIT (OUTPATIENT)
Dept: URGENT CARE | Age: 25
End: 2025-03-05
Payer: COMMERCIAL

## 2025-03-05 VITALS — TEMPERATURE: 98.2 F | DIASTOLIC BLOOD PRESSURE: 82 MMHG | HEART RATE: 88 BPM | SYSTOLIC BLOOD PRESSURE: 140 MMHG

## 2025-03-05 DIAGNOSIS — S38.03XA: ICD-10-CM

## 2025-03-05 DIAGNOSIS — S31.41XA LACERATION OF LABIA MINORA, INITIAL ENCOUNTER: Primary | ICD-10-CM

## 2025-03-05 PROCEDURE — 99213 OFFICE O/P EST LOW 20 MIN: CPT | Performed by: PHYSICIAN ASSISTANT

## 2025-03-05 PROCEDURE — 1036F TOBACCO NON-USER: CPT | Performed by: PHYSICIAN ASSISTANT

## 2025-03-05 RX ORDER — CEPHALEXIN 500 MG/1
500 CAPSULE ORAL 2 TIMES DAILY
Qty: 10 CAPSULE | Refills: 0 | Status: SHIPPED | OUTPATIENT
Start: 2025-03-05 | End: 2025-03-10
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
## 2025-03-22 ENCOUNTER — HOSPITAL ENCOUNTER (EMERGENCY)
Facility: HOSPITAL | Age: 25
Discharge: HOME | End: 2025-03-22
Payer: COMMERCIAL

## 2025-03-22 ENCOUNTER — APPOINTMENT (OUTPATIENT)
Dept: RADIOLOGY | Facility: HOSPITAL | Age: 25
End: 2025-03-22
Payer: COMMERCIAL

## 2025-03-22 VITALS
HEART RATE: 67 BPM | TEMPERATURE: 96.6 F | SYSTOLIC BLOOD PRESSURE: 96 MMHG | BODY MASS INDEX: 33.33 KG/M2 | DIASTOLIC BLOOD PRESSURE: 59 MMHG | OXYGEN SATURATION: 98 % | WEIGHT: 165 LBS | RESPIRATION RATE: 14 BRPM

## 2025-03-22 DIAGNOSIS — S00.03XA HEMATOMA OF SCALP, INITIAL ENCOUNTER: Primary | ICD-10-CM

## 2025-03-22 DIAGNOSIS — F10.920 ALCOHOLIC INTOXICATION WITHOUT COMPLICATION: ICD-10-CM

## 2025-03-22 LAB
ABO GROUP (TYPE) IN BLOOD: NORMAL
ALBUMIN SERPL BCP-MCNC: 4.8 G/DL (ref 3.4–5)
ALP SERPL-CCNC: 25 U/L (ref 33–110)
ALT SERPL W P-5'-P-CCNC: 10 U/L (ref 7–45)
ANION GAP SERPL CALCULATED.3IONS-SCNC: 16 MMOL/L (ref 10–20)
ANTIBODY SCREEN: NORMAL
AST SERPL W P-5'-P-CCNC: 17 U/L (ref 9–39)
BASOPHILS # BLD AUTO: 0.02 X10*3/UL (ref 0–0.1)
BASOPHILS NFR BLD AUTO: 0.3 %
BILIRUB SERPL-MCNC: 0.3 MG/DL (ref 0–1.2)
BUN SERPL-MCNC: 8 MG/DL (ref 6–23)
CALCIUM SERPL-MCNC: 9.5 MG/DL (ref 8.6–10.3)
CHLORIDE SERPL-SCNC: 108 MMOL/L (ref 98–107)
CO2 SERPL-SCNC: 23 MMOL/L (ref 21–32)
CREAT SERPL-MCNC: 0.78 MG/DL (ref 0.5–1.05)
EGFRCR SERPLBLD CKD-EPI 2021: >90 ML/MIN/1.73M*2
EOSINOPHIL # BLD AUTO: 0.01 X10*3/UL (ref 0–0.7)
EOSINOPHIL NFR BLD AUTO: 0.2 %
ERYTHROCYTE [DISTWIDTH] IN BLOOD BY AUTOMATED COUNT: 14.4 % (ref 11.5–14.5)
ETHANOL SERPL-MCNC: 280 MG/DL
GLUCOSE BLD MANUAL STRIP-MCNC: 73 MG/DL (ref 74–99)
GLUCOSE SERPL-MCNC: 82 MG/DL (ref 74–99)
HBV SURFACE AB SER-ACNC: <3.1 MIU/ML
HBV SURFACE AG SERPL QL IA: NONREACTIVE
HCG UR QL IA.RAPID: NEGATIVE
HCT VFR BLD AUTO: 45.8 % (ref 36–46)
HCV AB SER QL: NONREACTIVE
HGB BLD-MCNC: 14.6 G/DL (ref 12–16)
HIV 1+2 AB+HIV1 P24 AG SERPL QL IA: NONREACTIVE
IMM GRANULOCYTES # BLD AUTO: 0.03 X10*3/UL (ref 0–0.7)
IMM GRANULOCYTES NFR BLD AUTO: 0.5 % (ref 0–0.9)
INR PPP: 1 (ref 0.9–1.2)
LACTATE SERPL-SCNC: 2.4 MMOL/L (ref 0.4–2)
LACTATE SERPL-SCNC: 3.2 MMOL/L (ref 0.4–2)
LYMPHOCYTES # BLD AUTO: 1.27 X10*3/UL (ref 1.2–4.8)
LYMPHOCYTES NFR BLD AUTO: 21.1 %
MCH RBC QN AUTO: 27.4 PG (ref 26–34)
MCHC RBC AUTO-ENTMCNC: 31.9 G/DL (ref 32–36)
MCV RBC AUTO: 86 FL (ref 80–100)
MONOCYTES # BLD AUTO: 0.28 X10*3/UL (ref 0.1–1)
MONOCYTES NFR BLD AUTO: 4.7 %
NEUTROPHILS # BLD AUTO: 4.41 X10*3/UL (ref 1.2–7.7)
NEUTROPHILS NFR BLD AUTO: 73.2 %
NRBC BLD-RTO: 0 /100 WBCS (ref 0–0)
PLATELET # BLD AUTO: 360 X10*3/UL (ref 150–450)
POTASSIUM SERPL-SCNC: 3.7 MMOL/L (ref 3.5–5.3)
PROT SERPL-MCNC: 8.3 G/DL (ref 6.4–8.2)
PROTHROMBIN TIME: 11.2 SECONDS (ref 9.3–12.7)
RBC # BLD AUTO: 5.32 X10*6/UL (ref 4–5.2)
RH FACTOR (ANTIGEN D): NORMAL
SODIUM SERPL-SCNC: 143 MMOL/L (ref 136–145)
WBC # BLD AUTO: 6 X10*3/UL (ref 4.4–11.3)

## 2025-03-22 PROCEDURE — 86803 HEPATITIS C AB TEST: CPT | Mod: WESLAB

## 2025-03-22 PROCEDURE — 87491 CHLMYD TRACH DNA AMP PROBE: CPT | Mod: WESLAB

## 2025-03-22 PROCEDURE — 70450 CT HEAD/BRAIN W/O DYE: CPT

## 2025-03-22 PROCEDURE — 2500000004 HC RX 250 GENERAL PHARMACY W/ HCPCS (ALT 636 FOR OP/ED): Performed by: STUDENT IN AN ORGANIZED HEALTH CARE EDUCATION/TRAINING PROGRAM

## 2025-03-22 PROCEDURE — 2500000004 HC RX 250 GENERAL PHARMACY W/ HCPCS (ALT 636 FOR OP/ED)

## 2025-03-22 PROCEDURE — 87389 HIV-1 AG W/HIV-1&-2 AB AG IA: CPT | Mod: WESLAB

## 2025-03-22 PROCEDURE — 85610 PROTHROMBIN TIME: CPT | Performed by: STUDENT IN AN ORGANIZED HEALTH CARE EDUCATION/TRAINING PROGRAM

## 2025-03-22 PROCEDURE — 99285 EMERGENCY DEPT VISIT HI MDM: CPT | Mod: 25

## 2025-03-22 PROCEDURE — 86706 HEP B SURFACE ANTIBODY: CPT | Mod: WESLAB

## 2025-03-22 PROCEDURE — 36415 COLL VENOUS BLD VENIPUNCTURE: CPT | Performed by: STUDENT IN AN ORGANIZED HEALTH CARE EDUCATION/TRAINING PROGRAM

## 2025-03-22 PROCEDURE — 96361 HYDRATE IV INFUSION ADD-ON: CPT

## 2025-03-22 PROCEDURE — 82947 ASSAY GLUCOSE BLOOD QUANT: CPT | Mod: 59

## 2025-03-22 PROCEDURE — 85025 COMPLETE CBC W/AUTO DIFF WBC: CPT | Performed by: STUDENT IN AN ORGANIZED HEALTH CARE EDUCATION/TRAINING PROGRAM

## 2025-03-22 PROCEDURE — 71045 X-RAY EXAM CHEST 1 VIEW: CPT | Mod: FOREIGN READ | Performed by: RADIOLOGY

## 2025-03-22 PROCEDURE — 72170 X-RAY EXAM OF PELVIS: CPT | Mod: FOREIGN READ | Performed by: RADIOLOGY

## 2025-03-22 PROCEDURE — 82077 ASSAY SPEC XCP UR&BREATH IA: CPT | Performed by: STUDENT IN AN ORGANIZED HEALTH CARE EDUCATION/TRAINING PROGRAM

## 2025-03-22 PROCEDURE — 80053 COMPREHEN METABOLIC PANEL: CPT | Performed by: STUDENT IN AN ORGANIZED HEALTH CARE EDUCATION/TRAINING PROGRAM

## 2025-03-22 PROCEDURE — 86900 BLOOD TYPING SEROLOGIC ABO: CPT | Performed by: STUDENT IN AN ORGANIZED HEALTH CARE EDUCATION/TRAINING PROGRAM

## 2025-03-22 PROCEDURE — 83605 ASSAY OF LACTIC ACID: CPT | Performed by: STUDENT IN AN ORGANIZED HEALTH CARE EDUCATION/TRAINING PROGRAM

## 2025-03-22 PROCEDURE — 36415 COLL VENOUS BLD VENIPUNCTURE: CPT

## 2025-03-22 PROCEDURE — 87340 HEPATITIS B SURFACE AG IA: CPT | Mod: WESLAB

## 2025-03-22 PROCEDURE — 72125 CT NECK SPINE W/O DYE: CPT

## 2025-03-22 PROCEDURE — 96360 HYDRATION IV INFUSION INIT: CPT

## 2025-03-22 PROCEDURE — 72170 X-RAY EXAM OF PELVIS: CPT

## 2025-03-22 PROCEDURE — 81025 URINE PREGNANCY TEST: CPT | Performed by: STUDENT IN AN ORGANIZED HEALTH CARE EDUCATION/TRAINING PROGRAM

## 2025-03-22 PROCEDURE — 86780 TREPONEMA PALLIDUM: CPT | Mod: WESLAB

## 2025-03-22 PROCEDURE — 71045 X-RAY EXAM CHEST 1 VIEW: CPT

## 2025-03-22 RX ADMIN — SODIUM CHLORIDE, SODIUM LACTATE, POTASSIUM CHLORIDE, AND CALCIUM CHLORIDE 1000 ML: 600; 310; 30; 20 INJECTION, SOLUTION INTRAVENOUS at 06:31

## 2025-03-22 RX ADMIN — SODIUM CHLORIDE 1000 ML: 9 INJECTION, SOLUTION INTRAVENOUS at 09:25

## 2025-03-22 ASSESSMENT — COLUMBIA-SUICIDE SEVERITY RATING SCALE - C-SSRS
6. HAVE YOU EVER DONE ANYTHING, STARTED TO DO ANYTHING, OR PREPARED TO DO ANYTHING TO END YOUR LIFE?: NO
2. HAVE YOU ACTUALLY HAD ANY THOUGHTS OF KILLING YOURSELF?: NO
1. IN THE PAST MONTH, HAVE YOU WISHED YOU WERE DEAD OR WISHED YOU COULD GO TO SLEEP AND NOT WAKE UP?: NO
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
## 2025-03-22 NOTE — Clinical Note
Heaven Engel was seen and treated in our emergency department on 3/22/2025.  She may return to work on 03/23/2025.       If you have any questions or concerns, please don't hesitate to call.      Terrell Carballo, DO
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
## 2025-03-23 LAB
C TRACH RRNA SPEC QL NAA+PROBE: NEGATIVE
N GONORRHOEA DNA SPEC QL PROBE+SIG AMP: NEGATIVE
TREPONEMA PALLIDUM IGG+IGM AB [PRESENCE] IN SERUM OR PLASMA BY IMMUNOASSAY: NONREACTIVE

## 2025-04-08 ENCOUNTER — DOCUMENTATION (OUTPATIENT)
Dept: EMERGENCY MEDICINE | Facility: HOSPITAL | Age: 25
End: 2025-04-08
Payer: COMMERCIAL

## 2025-04-08 NOTE — PROGRESS NOTES
4/8/2025     Test:   Discussed HIV and HCV testing with the patient in the ED.   Patient received HIV and HCV test today    Test Result:  HIV Negative  HCV Negative  SYPHILIS Indeterminate    Result notification:  Patient was notified of their test results on 04/08/25 via Telephone (after verifying 3 identifiers)    Follow-up plan:   Patient was referred to Other on [Date]  Patient has appointment at [clinic name] on [Date]  Barriers to attending appointment: [free text, none]  Missed appointments: [unfilled]     Signed  GUERLINE Martinez   HIV/HCV FOCUS Program  Certified Community Health Worker - Research  Please contact me via email or [employer] with questions or phone [phone]

## 2025-08-04 ENCOUNTER — DOCUMENTATION (OUTPATIENT)
Facility: CLINIC | Age: 25
End: 2025-08-04
Payer: COMMERCIAL

## 2025-08-20 ENCOUNTER — OFFICE VISIT (OUTPATIENT)
Dept: URGENT CARE | Age: 25
End: 2025-08-20
Payer: COMMERCIAL

## 2025-08-20 VITALS
BODY MASS INDEX: 37.37 KG/M2 | HEART RATE: 63 BPM | WEIGHT: 185 LBS | OXYGEN SATURATION: 100 % | RESPIRATION RATE: 18 BRPM | TEMPERATURE: 98.8 F | DIASTOLIC BLOOD PRESSURE: 66 MMHG | SYSTOLIC BLOOD PRESSURE: 129 MMHG

## 2025-08-20 DIAGNOSIS — Z20.2 EXPOSURE TO STD: Primary | ICD-10-CM

## 2025-08-20 LAB
CHLAMYDIA TRACHOMATIS: NOT DETECTED
ELECTRONIC CONTROL: NORMAL
INTERNAL PROCESS CONTROL: NORMAL
NEISSERIA GONORRHOEAE: NOT DETECTED
POC APPEARANCE, URINE: CLEAR
POC BILIRUBIN, URINE: NEGATIVE
POC BLOOD, URINE: ABNORMAL
POC COLOR, URINE: YELLOW
POC GLUCOSE, URINE: NEGATIVE MG/DL
POC KETONES, URINE: NEGATIVE MG/DL
POC LEUKOCYTES, URINE: NEGATIVE
POC NITRITE,URINE: NEGATIVE
POC PH, URINE: 6 PH
POC PROTEIN, URINE: NEGATIVE MG/DL
POC SPECIFIC GRAVITY, URINE: <=1.005
POC TRICHOMONAS: NEGATIVE
POC UROBILINOGEN, URINE: 0.2 EU/DL
PREGNANCY TEST URINE, POC: NEGATIVE

## 2025-08-20 PROCEDURE — 87808 TRICHOMONAS ASSAY W/OPTIC: CPT | Performed by: NURSE PRACTITIONER

## 2025-08-20 PROCEDURE — 81025 URINE PREGNANCY TEST: CPT | Performed by: NURSE PRACTITIONER

## 2025-08-20 PROCEDURE — 81003 URINALYSIS AUTO W/O SCOPE: CPT | Performed by: NURSE PRACTITIONER

## 2025-08-20 PROCEDURE — 87801 DETECT AGNT MULT DNA AMPLI: CPT | Performed by: NURSE PRACTITIONER

## 2025-08-20 PROCEDURE — 99214 OFFICE O/P EST MOD 30 MIN: CPT | Performed by: NURSE PRACTITIONER

## 2025-08-20 PROCEDURE — 1036F TOBACCO NON-USER: CPT | Performed by: NURSE PRACTITIONER
